# Patient Record
Sex: FEMALE | Race: WHITE | Employment: STUDENT | URBAN - METROPOLITAN AREA
[De-identification: names, ages, dates, MRNs, and addresses within clinical notes are randomized per-mention and may not be internally consistent; named-entity substitution may affect disease eponyms.]

---

## 2023-03-28 ENCOUNTER — HOSPITAL ENCOUNTER (OUTPATIENT)
Age: 23
Setting detail: SPECIMEN
Discharge: HOME | End: 2023-03-28

## 2023-03-28 ENCOUNTER — OFFICE VISIT (OUTPATIENT)
Dept: OBGYN CLINIC | Age: 23
End: 2023-03-28
Payer: COMMERCIAL

## 2023-03-28 VITALS
DIASTOLIC BLOOD PRESSURE: 70 MMHG | SYSTOLIC BLOOD PRESSURE: 110 MMHG | WEIGHT: 173.8 LBS | HEIGHT: 67 IN | BODY MASS INDEX: 27.28 KG/M2

## 2023-03-28 DIAGNOSIS — Z01.419 VISIT FOR GYNECOLOGIC EXAMINATION: Primary | ICD-10-CM

## 2023-03-28 PROCEDURE — 99385 PREV VISIT NEW AGE 18-39: CPT

## 2023-03-28 ASSESSMENT — ENCOUNTER SYMPTOMS
DIARRHEA: 0
ABDOMINAL PAIN: 0
CONSTIPATION: 0
SHORTNESS OF BREATH: 0

## 2023-03-28 NOTE — PROGRESS NOTES
YEARLY PHYSICAL    Date of service: 3/28/2023    Emelyn Falcon  Is a 26 y.o. female    PT's PCP is: Valarie Valles     : 2000                                         Chaperone for Intimate Exam  Chaperone was offered as part of the rooming process. Patient declined and agrees to continue with exam without a chaperone. Subjective:       Patient's last menstrual period was 2023 (exact date). Are your menses regular: yes    OB History    Para Term  AB Living   0 0 0 0 0 0   SAB IAB Ectopic Molar Multiple Live Births   0 0 0 0 0 0        Social History     Tobacco Use   Smoking Status Never   Smokeless Tobacco Never        Social History     Substance and Sexual Activity   Alcohol Use Yes       Family History   Problem Relation Age of Onset    Cancer Paternal Grandfather         skin cancer    Lung Cancer Maternal Grandmother     Deep Vein Thrombosis Father        Any family history of breast or ovarian cancer: No    Any family history of blood clots: Yes      Allergies: Patient has no known allergies. No current outpatient medications on file. Social History     Substance and Sexual Activity   Sexual Activity Never       Any bleeding or pain with intercourse: not sexually active    Last Yearly:  n/a    Last pap: n/a    Last HPV: n/a    Last Mammogram: never    Last Dexascan never    Last colorectal screen- type:never    Do you do self breast exams: No    Past Medical History:   Diagnosis Date    Cochlear implant in place     Depression        Past Surgical History:   Procedure Laterality Date    ADENOIDECTOMY      COCHLEAR IMPLANT         Family History   Problem Relation Age of Onset    Cancer Paternal Grandfather         skin cancer    Lung Cancer Maternal Grandmother     Deep Vein Thrombosis Father        Chief Complaint   Patient presents with    Annual Exam     Here for first annual and pap.  No

## 2023-04-05 LAB — CYTOLOGY REPORT: NORMAL

## 2023-05-15 SDOH — ECONOMIC STABILITY: HOUSING INSECURITY
IN THE LAST 12 MONTHS, WAS THERE A TIME WHEN YOU DID NOT HAVE A STEADY PLACE TO SLEEP OR SLEPT IN A SHELTER (INCLUDING NOW)?: NO

## 2023-05-15 SDOH — ECONOMIC STABILITY: TRANSPORTATION INSECURITY
IN THE PAST 12 MONTHS, HAS LACK OF TRANSPORTATION KEPT YOU FROM MEETINGS, WORK, OR FROM GETTING THINGS NEEDED FOR DAILY LIVING?: NO

## 2023-05-15 SDOH — ECONOMIC STABILITY: INCOME INSECURITY: HOW HARD IS IT FOR YOU TO PAY FOR THE VERY BASICS LIKE FOOD, HOUSING, MEDICAL CARE, AND HEATING?: NOT VERY HARD

## 2023-05-15 SDOH — ECONOMIC STABILITY: FOOD INSECURITY: WITHIN THE PAST 12 MONTHS, YOU WORRIED THAT YOUR FOOD WOULD RUN OUT BEFORE YOU GOT MONEY TO BUY MORE.: NEVER TRUE

## 2023-05-15 SDOH — ECONOMIC STABILITY: FOOD INSECURITY: WITHIN THE PAST 12 MONTHS, THE FOOD YOU BOUGHT JUST DIDN'T LAST AND YOU DIDN'T HAVE MONEY TO GET MORE.: NEVER TRUE

## 2023-05-16 ENCOUNTER — OFFICE VISIT (OUTPATIENT)
Dept: OBGYN CLINIC | Age: 23
End: 2023-05-16
Payer: COMMERCIAL

## 2023-05-16 VITALS — BODY MASS INDEX: 27.16 KG/M2 | DIASTOLIC BLOOD PRESSURE: 82 MMHG | SYSTOLIC BLOOD PRESSURE: 122 MMHG | WEIGHT: 173.4 LBS

## 2023-05-16 DIAGNOSIS — N64.4 BREAST PAIN IN FEMALE: Primary | ICD-10-CM

## 2023-05-16 DIAGNOSIS — R21 RASH: ICD-10-CM

## 2023-05-16 PROCEDURE — 99213 OFFICE O/P EST LOW 20 MIN: CPT

## 2023-05-22 ENCOUNTER — TELEPHONE (OUTPATIENT)
Dept: OBGYN CLINIC | Age: 23
End: 2023-05-22

## 2023-05-22 NOTE — TELEPHONE ENCOUNTER
I talked to [facility] again who again said bilateral/diffuse breast pain is not a reason in the radiology world to do a breast usn because hormones cause breast pain and breast pain is normal.  Unless there is a particular single spot that can be pinpointed as causing pain. Patient is to young to get a mammogram done. Discussed verbally with Brittney Mcginnis and then I called patient. Patient reports that the rash she had is gone. The pain is bilateral diffuse. Reassured patient. Patient ok with not getting an ultrasound done. She is told to call us if anything changes or new worsening symptoms for further evaluation.

## 2023-05-22 NOTE — TELEPHONE ENCOUNTER
Jim Arredondo from ultrasound at [facility] called. Patient is scheduled tomorrow morning for bilateral breast tenderness. Jim Arredondo needs to know if the tenderness if bilaterally or diffuse. She said ultrasound is not used if diffuse breast tenderness. Please advised ASAP. We need to call Jim Arredondo back at [phone] and ask for Luz or Ultrasound.

## 2024-03-28 ENCOUNTER — OFFICE VISIT (OUTPATIENT)
Dept: OBGYN CLINIC | Age: 24
End: 2024-03-28
Payer: COMMERCIAL

## 2024-03-28 VITALS — WEIGHT: 180.6 LBS | HEIGHT: 67 IN | BODY MASS INDEX: 28.35 KG/M2

## 2024-03-28 DIAGNOSIS — Z01.419 VISIT FOR GYNECOLOGIC EXAMINATION: Primary | ICD-10-CM

## 2024-03-28 PROCEDURE — 99395 PREV VISIT EST AGE 18-39: CPT

## 2024-03-28 ASSESSMENT — ENCOUNTER SYMPTOMS
ABDOMINAL PAIN: 0
SHORTNESS OF BREATH: 0
DIARRHEA: 0
CONSTIPATION: 0

## 2024-03-28 NOTE — PROGRESS NOTES
YEARLY PHYSICAL    Date of service: 3/28/2024    Laurie Gasca  Is a 24 y.o. female    PT's PCP is: jL Dennis     : 2000                                         Chaperone for Intimate Exam  Chaperone was offered and accepted as part of the rooming process.  Chaperone: LARRY Lagunas student      Subjective:       Patient's last menstrual period was 2024 (exact date).     Are your menses regular: yes    OB History    Para Term  AB Living   0 0 0 0 0 0   SAB IAB Ectopic Molar Multiple Live Births   0 0 0 0 0 0        Social History     Tobacco Use   Smoking Status Never   Smokeless Tobacco Never        Social History     Substance and Sexual Activity   Alcohol Use Yes    Comment: Social events       Family History   Problem Relation Age of Onset    Cancer Paternal Grandfather         skin cancer    Lung Cancer Maternal Grandmother     Cancer Maternal Grandmother     Deep Vein Thrombosis Father        Any family history of breast or ovarian cancer: No    Any family history of blood clots: Yes      Allergies: Patient has no known allergies.    No current outpatient medications on file.    Social History     Substance and Sexual Activity   Sexual Activity Never       Any bleeding or pain with intercourse: n/a    Last Yearly:  3/28/23    Last pap: 3/28/23 NL    Last HPV: n/a    Last Mammogram: never    Last Dexascan never    Last colorectal screen- type:never    Do you do self breast exams: Yes    Past Medical History:   Diagnosis Date    Cochlear implant in place     Depression        Past Surgical History:   Procedure Laterality Date    ADENOIDECTOMY      COCHLEAR IMPLANT         Family History   Problem Relation Age of Onset    Cancer Paternal Grandfather         skin cancer    Lung Cancer Maternal Grandmother     Cancer Maternal Grandmother     Deep Vein Thrombosis Father        Chief Complaint   Patient presents with

## 2025-04-03 ENCOUNTER — OFFICE VISIT (OUTPATIENT)
Dept: OBGYN CLINIC | Age: 25
End: 2025-04-03

## 2025-04-03 ENCOUNTER — HOSPITAL ENCOUNTER (OUTPATIENT)
Age: 25
Setting detail: SPECIMEN
Discharge: HOME | End: 2025-04-03

## 2025-04-03 VITALS
DIASTOLIC BLOOD PRESSURE: 74 MMHG | WEIGHT: 194 LBS | HEIGHT: 67 IN | SYSTOLIC BLOOD PRESSURE: 134 MMHG | BODY MASS INDEX: 30.45 KG/M2

## 2025-04-03 DIAGNOSIS — N89.8 VAGINAL DISCHARGE: ICD-10-CM

## 2025-04-03 DIAGNOSIS — N89.8 VAGINAL ODOR: ICD-10-CM

## 2025-04-03 DIAGNOSIS — Z01.419 VISIT FOR GYNECOLOGIC EXAMINATION: Primary | ICD-10-CM

## 2025-04-03 NOTE — PROGRESS NOTES
YEARLY PHYSICAL    Date of service: 4/3/2025    Laurie Gasca  Is a 25 y.o. female    PT's PCP is: Lj Dennis MD     : 2000                                         Chaperone for Intimate Exam  Chaperone was offered as part of the rooming process. Patient declined and agrees to continue with exam without a chaperone.  Chaperone: N/A      Subjective:       Patient's last menstrual period was 2025 (exact date).     Are your menses regular: yes    OB History    Para Term  AB Living   0 0 0 0 0 0   SAB IAB Ectopic Molar Multiple Live Births   0 0 0 0 0 0        Social History     Tobacco Use   Smoking Status Never   Smokeless Tobacco Never        Social History     Substance and Sexual Activity   Alcohol Use Yes    Comment: Social events       Family History   Problem Relation Age of Onset    Cancer Paternal Grandfather         skin cancer    Lung Cancer Maternal Grandmother     Cancer Maternal Grandmother     Deep Vein Thrombosis Father        Any family history of breast or ovarian cancer: No    Any family history of blood clots: Yes      Allergies: Patient has no known allergies.      Current Outpatient Medications:     metroNIDAZOLE (FLAGYL) 500 MG tablet, Take 1 tablet by mouth 2 times daily for 7 days, Disp: 14 tablet, Rfl: 0    Social History     Substance and Sexual Activity   Sexual Activity Yes    Partners: Male       Any bleeding or pain with intercourse: No    Last Yearly:  3-    Last pap: 3-    Do you do self breast exams: Yes    Past Medical History:   Diagnosis Date    Cochlear implant in place     Depression        Past Surgical History:   Procedure Laterality Date    ADENOIDECTOMY      COCHLEAR IMPLANT         Family History   Problem Relation Age of Onset    Cancer Paternal Grandfather         skin cancer    Lung Cancer Maternal Grandmother     Cancer Maternal Grandmother     Deep Vein

## 2025-04-04 ENCOUNTER — RESULTS FOLLOW-UP (OUTPATIENT)
Dept: OBGYN CLINIC | Age: 25
End: 2025-04-04

## 2025-04-04 LAB
CANDIDA SPECIES: NEGATIVE
GARDNERELLA VAGINALIS: POSITIVE
SOURCE: ABNORMAL
TRICHOMONAS: NEGATIVE

## 2025-04-04 RX ORDER — METRONIDAZOLE 500 MG/1
500 TABLET ORAL 2 TIMES DAILY
Qty: 14 TABLET | Refills: 0 | Status: SHIPPED | OUTPATIENT
Start: 2025-04-04 | End: 2025-04-11

## 2025-04-05 ASSESSMENT — ENCOUNTER SYMPTOMS
SHORTNESS OF BREATH: 0
DIARRHEA: 0
ABDOMINAL PAIN: 0
CONSTIPATION: 0